# Patient Record
Sex: FEMALE | Race: WHITE | HISPANIC OR LATINO | Employment: PART TIME | URBAN - NONMETROPOLITAN AREA
[De-identification: names, ages, dates, MRNs, and addresses within clinical notes are randomized per-mention and may not be internally consistent; named-entity substitution may affect disease eponyms.]

---

## 2023-05-11 ENCOUNTER — HOSPITAL ENCOUNTER (EMERGENCY)
Facility: HOSPITAL | Age: 28
Discharge: HOME | End: 2023-05-12
Payer: MEDICAID

## 2023-05-11 DIAGNOSIS — R11.2 NAUSEA AND VOMITING, UNSPECIFIED VOMITING TYPE: ICD-10-CM

## 2023-05-11 DIAGNOSIS — Z3A.30 30 WEEKS GESTATION OF PREGNANCY: ICD-10-CM

## 2023-05-11 DIAGNOSIS — G43.901 MIGRAINE WITH STATUS MIGRAINOSUS, NOT INTRACTABLE, UNSPECIFIED MIGRAINE TYPE: Primary | ICD-10-CM

## 2023-05-11 PROCEDURE — 96375 TX/PRO/DX INJ NEW DRUG ADDON: CPT

## 2023-05-11 PROCEDURE — 99284 EMERGENCY DEPT VISIT MOD MDM: CPT

## 2023-05-11 PROCEDURE — 25000003 PHARM REV CODE 250

## 2023-05-11 PROCEDURE — 63600175 PHARM REV CODE 636 W HCPCS

## 2023-05-11 PROCEDURE — 96374 THER/PROPH/DIAG INJ IV PUSH: CPT

## 2023-05-11 PROCEDURE — 96361 HYDRATE IV INFUSION ADD-ON: CPT

## 2023-05-11 RX ORDER — DIPHENHYDRAMINE HYDROCHLORIDE 50 MG/ML
25 INJECTION INTRAMUSCULAR; INTRAVENOUS
Status: COMPLETED | OUTPATIENT
Start: 2023-05-11 | End: 2023-05-11

## 2023-05-11 RX ORDER — ONDANSETRON 4 MG/1
4 TABLET, ORALLY DISINTEGRATING ORAL EVERY 8 HOURS PRN
Qty: 20 TABLET | Refills: 0 | Status: SHIPPED | OUTPATIENT
Start: 2023-05-11

## 2023-05-11 RX ORDER — DEXAMETHASONE SODIUM PHOSPHATE 4 MG/ML
8 INJECTION, SOLUTION INTRA-ARTICULAR; INTRALESIONAL; INTRAMUSCULAR; INTRAVENOUS; SOFT TISSUE
Status: COMPLETED | OUTPATIENT
Start: 2023-05-11 | End: 2023-05-11

## 2023-05-11 RX ORDER — PROCHLORPERAZINE EDISYLATE 5 MG/ML
10 INJECTION INTRAMUSCULAR; INTRAVENOUS
Status: COMPLETED | OUTPATIENT
Start: 2023-05-11 | End: 2023-05-11

## 2023-05-11 RX ADMIN — DIPHENHYDRAMINE HYDROCHLORIDE 25 MG: 50 INJECTION INTRAMUSCULAR; INTRAVENOUS at 11:05

## 2023-05-11 RX ADMIN — PROCHLORPERAZINE EDISYLATE 10 MG: 5 INJECTION INTRAMUSCULAR; INTRAVENOUS at 11:05

## 2023-05-11 RX ADMIN — SODIUM CHLORIDE 1000 ML: 9 INJECTION, SOLUTION INTRAVENOUS at 11:05

## 2023-05-11 RX ADMIN — DEXAMETHASONE SODIUM PHOSPHATE 8 MG: 4 INJECTION, SOLUTION INTRA-ARTICULAR; INTRALESIONAL; INTRAMUSCULAR; INTRAVENOUS; SOFT TISSUE at 11:05

## 2023-05-12 VITALS
RESPIRATION RATE: 18 BRPM | OXYGEN SATURATION: 95 % | DIASTOLIC BLOOD PRESSURE: 60 MMHG | HEIGHT: 67 IN | SYSTOLIC BLOOD PRESSURE: 88 MMHG | WEIGHT: 187.38 LBS | BODY MASS INDEX: 29.41 KG/M2 | HEART RATE: 84 BPM

## 2023-05-12 NOTE — ED TRIAGE NOTES
Per pt family member pt is having increased vomiting and headache since yesterday. Pt is 7 months OB. Pt denies any abdominal pain.

## 2023-05-12 NOTE — ED PROVIDER NOTES
Encounter Date: 5/11/2023       History     Chief Complaint   Patient presents with    Vomiting     7 months preg.    Headache     28-year-old female presents complaining of migraine headache since yesterday.  She is 7 months pregnant.  She reports having similar headaches in the past.  These headaches are usually associated with nausea and vomiting.  She has had nausea and vomiting since yesterday as well.  She denies any abdominal pain or vaginal bleeding.  She denies fever or any neurologic symptoms.  She is a [place] speaker, but brought an , who is very good.    The history is provided by the patient. The history is limited by a language barrier. A  was used.   Review of patient's allergies indicates:   Allergen Reactions    Tylenol [acetaminophen]      No past medical history on file.  No past surgical history on file.  No family history on file.     Review of Systems   Gastrointestinal:  Positive for nausea and vomiting.   Neurological:  Positive for headaches.   All other systems reviewed and are negative.    Physical Exam     Initial Vitals [05/11/23 2152]   BP Pulse Resp Temp SpO2   120/75 73 18 -- 98 %      MAP       --         Physical Exam    Nursing note and vitals reviewed.  Constitutional: Vital signs are normal. She appears well-developed and well-nourished. She is cooperative.   HENT:   Head: Normocephalic and atraumatic.   Mouth/Throat: Oropharynx is clear and moist.   Eyes: Conjunctivae, EOM and lids are normal. Pupils are equal, round, and reactive to light.   Neck: Trachea normal. Neck supple.   Normal range of motion.  Cardiovascular:  Normal rate, regular rhythm, normal heart sounds and intact distal pulses.           Pulmonary/Chest: Breath sounds normal.   Abdominal: Abdomen is soft. Bowel sounds are normal.   Musculoskeletal:         General: Normal range of motion.      Cervical back: Normal, normal range of motion and neck supple.      Lumbar back: Normal.      Neurological: She is alert and oriented to person, place, and time. She has normal strength and normal reflexes. She displays normal reflexes. No cranial nerve deficit or sensory deficit. Coordination normal. GCS score is 15. GCS eye subscore is 4. GCS verbal subscore is 5. GCS motor subscore is 6.   Skin: Skin is warm, dry and intact. Capillary refill takes less than 2 seconds.   Psychiatric: She has a normal mood and affect. Her speech is normal and behavior is normal. Judgment and thought content normal. Cognition and memory are normal.       ED Course   Procedures  Labs Reviewed - No data to display       Imaging Results    None          Medications   sodium chloride 0.9% bolus 1,000 mL 1,000 mL (1,000 mLs Intravenous New Bag 5/11/23 2316)   prochlorperazine injection Soln 10 mg (10 mg Intravenous Given 5/11/23 2316)   dexAMETHasone injection 8 mg (8 mg Intravenous Given 5/11/23 2316)   diphenhydrAMINE injection 25 mg (25 mg Intravenous Given 5/11/23 2316)     Medical Decision Making:   Initial Assessment:   Migraine headache (recurrent), nausea and vomiting, 7 months pregnant  ED Management:  Compazine, Decadron, IV fluids, Benadryl  Pain resolved.  Resting comfortably.                        Clinical Impression:   Final diagnoses:  [G43.901] Migraine with status migrainosus, not intractable, unspecified migraine type (Primary)  [Z3A.30] 30 weeks gestation of pregnancy  [R11.2] Nausea and vomiting, unspecified vomiting type        ED Disposition Condition    Discharge Good          ED Prescriptions       Medication Sig Dispense Start Date End Date Auth. Provider    ondansetron (ZOFRAN-ODT) 4 MG TbDL Take 1 tablet (4 mg total) by mouth every 8 (eight) hours as needed. 20 tablet 5/11/2023 -- Sammy Renae MD          Follow-up Information       Follow up With Specialties Details Why Contact Info    OB  Call in 1 day               Sammy Renae MD  05/11/23 6886

## 2023-06-29 ENCOUNTER — HOSPITAL ENCOUNTER (EMERGENCY)
Facility: HOSPITAL | Age: 28
Discharge: HOME | End: 2023-06-29
Payer: MEDICAID

## 2023-06-29 VITALS
BODY MASS INDEX: 30.25 KG/M2 | SYSTOLIC BLOOD PRESSURE: 108 MMHG | OXYGEN SATURATION: 95 % | HEIGHT: 66 IN | RESPIRATION RATE: 15 BRPM | DIASTOLIC BLOOD PRESSURE: 52 MMHG | TEMPERATURE: 100 F | HEART RATE: 87 BPM

## 2023-06-29 DIAGNOSIS — N10 ACUTE PYELONEPHRITIS: Primary | ICD-10-CM

## 2023-06-29 DIAGNOSIS — R50.9 FEVER, UNSPECIFIED FEVER CAUSE: ICD-10-CM

## 2023-06-29 LAB
ALBUMIN SERPL-MCNC: 4.6 G/DL (ref 3.4–5)
ALBUMIN/GLOB SERPL: 1.4 RATIO
ALP SERPL-CCNC: 96 UNIT/L (ref 50–144)
ALT SERPL-CCNC: 31 UNIT/L (ref 1–45)
ANION GAP SERPL CALC-SCNC: 9 MEQ/L (ref 2–13)
APPEARANCE UR: CLEAR
AST SERPL-CCNC: 38 UNIT/L (ref 14–36)
B PERT.PT PRMT NPH QL NAA+NON-PROBE: NOT DETECTED
BACTERIA #/AREA URNS AUTO: ABNORMAL /HPF
BASOPHILS # BLD AUTO: 0.04 X10(3)/MCL (ref 0.01–0.08)
BASOPHILS NFR BLD AUTO: 0.5 % (ref 0.1–1.2)
BILIRUB UR QL STRIP.AUTO: NEGATIVE MG/DL
BILIRUBIN DIRECT+TOT PNL SERPL-MCNC: 0.5 MG/DL (ref 0–1)
BUN SERPL-MCNC: 13 MG/DL (ref 7–20)
C PNEUM DNA NPH QL NAA+NON-PROBE: NOT DETECTED
CALCIUM SERPL-MCNC: 8.9 MG/DL (ref 8.4–10.2)
CHLORIDE SERPL-SCNC: 104 MMOL/L (ref 98–110)
CO2 SERPL-SCNC: 24 MMOL/L (ref 21–32)
COLOR UR: YELLOW
CREAT SERPL-MCNC: 0.9 MG/DL (ref 0.66–1.25)
CREAT/UREA NIT SERPL: 14 (ref 12–20)
EOSINOPHIL # BLD AUTO: 0.27 X10(3)/MCL (ref 0.04–0.36)
EOSINOPHIL NFR BLD AUTO: 3 % (ref 0.7–7)
ERYTHROCYTE [DISTWIDTH] IN BLOOD BY AUTOMATED COUNT: 13.1 % (ref 11–14.5)
FLUAV H1 2009 PAN RNA NPH NAA+NON-PROBE: NORMAL
FLUAV H1 RNA NPH QL NAA+NON-PROBE: NORMAL
FLUAV H3 RNA NPH QL NAA+NON-PROBE: NORMAL
FLUAV RNA NPH QL NAA+NON-PROBE: NOT DETECTED
FLUAV RNA RESP QL NAA+PROBE: NORMAL
FLUBV RNA NPH QL NAA+NON-PROBE: NOT DETECTED
GFR SERPLBLD CREATININE-BSD FMLA CKD-EPI: 89 MLS/MIN/1.73/M2
GLOBULIN SER-MCNC: 3.4 GM/DL (ref 2–3.9)
GLUCOSE SERPL-MCNC: 102 MG/DL (ref 70–115)
GLUCOSE UR QL STRIP.AUTO: NEGATIVE MG/DL
HADV DNA NPH QL NAA+NON-PROBE: NOT DETECTED
HCOV 229E RNA NPH QL NAA+NON-PROBE: NOT DETECTED
HCOV HKU1 RNA NPH QL NAA+NON-PROBE: NOT DETECTED
HCOV NL63 RNA NPH QL NAA+NON-PROBE: NOT DETECTED
HCOV OC43 RNA NPH QL NAA+NON-PROBE: NOT DETECTED
HCT VFR BLD AUTO: 40.7 % (ref 36–48)
HGB BLD-MCNC: 13.9 G/DL (ref 11.8–16)
HMPV RNA NPH QL NAA+NON-PROBE: NOT DETECTED
HPIV1 RNA NPH QL NAA+NON-PROBE: NOT DETECTED
HPIV2 RNA NPH QL NAA+NON-PROBE: NOT DETECTED
HPIV3 RNA NPH QL NAA+NON-PROBE: NOT DETECTED
HPIV4 RNA NPH QL NAA+NON-PROBE: NOT DETECTED
IMM GRANULOCYTES # BLD AUTO: 0.02 X10(3)/MCL (ref 0–0.03)
IMM GRANULOCYTES NFR BLD AUTO: 0.2 % (ref 0–0.5)
KETONES UR QL STRIP.AUTO: NEGATIVE MG/DL
LACTATE SERPL-SCNC: 1.9 MMOL/L (ref 0.4–2)
LEUKOCYTE ESTERASE UR QL STRIP.AUTO: ABNORMAL UNIT/L
LYMPHOCYTES # BLD AUTO: 0.61 X10(3)/MCL (ref 1.16–3.74)
LYMPHOCYTES NFR BLD AUTO: 6.9 % (ref 20–55)
M PNEUMO DNA NPH QL NAA+NON-PROBE: NOT DETECTED
MCH RBC QN AUTO: 29 PG (ref 27–34)
MCHC RBC AUTO-ENTMCNC: 34.2 G/DL (ref 31–37)
MCV RBC AUTO: 85 FL (ref 79–99)
MONOCYTES # BLD AUTO: 0.37 X10(3)/MCL (ref 0.24–0.36)
MONOCYTES NFR BLD AUTO: 4.2 % (ref 4.7–12.5)
NEUTROPHILS # BLD AUTO: 7.55 X10(3)/MCL (ref 1.56–6.13)
NEUTROPHILS NFR BLD AUTO: 85.2 % (ref 37–73)
NITRITE UR QL STRIP.AUTO: POSITIVE
NRBC BLD AUTO-RTO: 0 %
PH UR STRIP.AUTO: 8.5 [PH]
PLATELET # BLD AUTO: 212 X10(3)/MCL (ref 140–371)
PMV BLD AUTO: 10.5 FL (ref 9.4–12.4)
POTASSIUM SERPL-SCNC: 3.6 MMOL/L (ref 3.5–5.1)
PROT SERPL-MCNC: 8 GM/DL (ref 6.3–8.2)
PROT UR QL STRIP.AUTO: NEGATIVE MG/DL
RAPID GROUP A STREP (OHS): NEGATIVE
RBC # BLD AUTO: 4.79 X10(6)/MCL (ref 4–5.1)
RBC #/AREA URNS AUTO: ABNORMAL /HPF
RBC UR QL AUTO: ABNORMAL UNIT/L
RSV RNA NPH QL NAA+NON-PROBE: NOT DETECTED
RSV RNA NPH QL NAA+NON-PROBE: NOT DETECTED
RV+EV RNA NPH QL NAA+NON-PROBE: NOT DETECTED
SARS-COV-2 RNA RESP QL NAA+PROBE: NOT DETECTED
SODIUM SERPL-SCNC: 137 MMOL/L (ref 135–145)
SP GR UR STRIP.AUTO: 1.01
SQUAMOUS #/AREA URNS AUTO: ABNORMAL /HPF
UROBILINOGEN UR STRIP-ACNC: 0.2 MG/DL
WBC # SPEC AUTO: 8.86 X10(3)/MCL (ref 4–11.5)
WBC #/AREA URNS AUTO: ABNORMAL /HPF

## 2023-06-29 PROCEDURE — 80053 COMPREHEN METABOLIC PANEL: CPT

## 2023-06-29 PROCEDURE — 96361 HYDRATE IV INFUSION ADD-ON: CPT

## 2023-06-29 PROCEDURE — 83605 ASSAY OF LACTIC ACID: CPT

## 2023-06-29 PROCEDURE — 87651 STREP A DNA AMP PROBE: CPT

## 2023-06-29 PROCEDURE — 87088 URINE BACTERIA CULTURE: CPT

## 2023-06-29 PROCEDURE — 63600175 PHARM REV CODE 636 W HCPCS

## 2023-06-29 PROCEDURE — 87186 SC STD MICRODIL/AGAR DIL: CPT

## 2023-06-29 PROCEDURE — 99284 EMERGENCY DEPT VISIT MOD MDM: CPT | Mod: 25

## 2023-06-29 PROCEDURE — 96365 THER/PROPH/DIAG IV INF INIT: CPT

## 2023-06-29 PROCEDURE — 36415 COLL VENOUS BLD VENIPUNCTURE: CPT

## 2023-06-29 PROCEDURE — 85025 COMPLETE CBC W/AUTO DIFF WBC: CPT

## 2023-06-29 PROCEDURE — 81001 URINALYSIS AUTO W/SCOPE: CPT

## 2023-06-29 PROCEDURE — 87040 BLOOD CULTURE FOR BACTERIA: CPT

## 2023-06-29 PROCEDURE — 25000003 PHARM REV CODE 250

## 2023-06-29 PROCEDURE — 87798 DETECT AGENT NOS DNA AMP: CPT

## 2023-06-29 RX ORDER — NITROFURANTOIN 25; 75 MG/1; MG/1
100 CAPSULE ORAL 2 TIMES DAILY
Qty: 14 CAPSULE | Refills: 0 | Status: SHIPPED | OUTPATIENT
Start: 2023-06-29 | End: 2023-07-06

## 2023-06-29 RX ORDER — IBUPROFEN 600 MG/1
600 TABLET ORAL
Status: COMPLETED | OUTPATIENT
Start: 2023-06-29 | End: 2023-06-29

## 2023-06-29 RX ADMIN — SODIUM CHLORIDE 1000 ML: 9 INJECTION, SOLUTION INTRAVENOUS at 09:06

## 2023-06-29 RX ADMIN — DEXTROSE MONOHYDRATE 2 G: 5 INJECTION INTRAVENOUS at 09:06

## 2023-06-29 RX ADMIN — IBUPROFEN 600 MG: 600 TABLET, FILM COATED ORAL at 07:06

## 2023-06-29 RX ADMIN — SODIUM CHLORIDE 1000 ML: 9 INJECTION, SOLUTION INTRAVENOUS at 07:06

## 2023-06-30 NOTE — ED PROVIDER NOTES
Encounter Date: 2023       History     Chief Complaint   Patient presents with    Fever     Pt had c section x 15 days ago and now has a fever. Pt denies taking any medications at home for fever. Denies any issues during the pregnancy.  needed.      28-year-old female presents complaining of high fever and myalgias.  These symptoms began today.  She had an uneventful  15 days ago.  She is breastfeeding, but has no breast pain.  She denies any cough, sore throat or other respiratory symptoms.    The history is provided by the patient. The history is limited by a language barrier. A  was used.   Review of patient's allergies indicates:   Allergen Reactions    Tylenol [acetaminophen]      No past medical history on file.  No past surgical history on file.  No family history on file.     Review of Systems   Constitutional:  Positive for fever.   HENT:  Negative for sore throat.    Respiratory:  Negative for shortness of breath.    Cardiovascular:  Negative for chest pain.   Gastrointestinal:  Negative for nausea.   Genitourinary:  Negative for dysuria.   Musculoskeletal:  Positive for myalgias. Negative for back pain.   Skin:  Negative for rash.   Neurological:  Negative for weakness.   Hematological:  Does not bruise/bleed easily.   All other systems reviewed and are negative.    Physical Exam     Initial Vitals [23]   BP Pulse Resp Temp SpO2   116/84 (!) 145 (!) 22 (!) 103 °F (39.4 °C) 96 %      MAP       --         Physical Exam    Nursing note and vitals reviewed.  Constitutional: Vital signs are normal. She appears well-developed and well-nourished. She is cooperative.   HENT:   Head: Normocephalic and atraumatic.   Mouth/Throat: Oropharynx is clear and moist.   Eyes: Conjunctivae, EOM and lids are normal. Pupils are equal, round, and reactive to light.   Neck: Trachea normal. Neck supple.   Normal range of motion.  Cardiovascular:  Regular rhythm,  normal heart sounds and intact distal pulses.           She is tachycardic   Pulmonary/Chest: Breath sounds normal.   Abdominal: Abdomen is soft. Bowel sounds are normal. There is no abdominal tenderness.    incision is well healed, with no erythema or drainage.  There is no induration or tenderness   Musculoskeletal:         General: Normal range of motion.      Cervical back: Normal, normal range of motion and neck supple.      Lumbar back: Normal.     Neurological: She is alert and oriented to person, place, and time. She has normal strength. Coordination normal.   Skin: Skin is warm, dry and intact. Capillary refill takes less than 2 seconds.   Psychiatric: She has a normal mood and affect. Her speech is normal and behavior is normal. Judgment and thought content normal. Cognition and memory are normal.     ED Course   Procedures  Labs Reviewed   COMPREHENSIVE METABOLIC PANEL - Abnormal; Notable for the following components:       Result Value    Aspartate Aminotransferase 38 (*)     All other components within normal limits   URINALYSIS, REFLEX TO URINE CULTURE - Abnormal; Notable for the following components:    Blood, UA Large (*)     Nitrites, UA Positive (*)     Leukocyte Esterase, UA Trace (*)     All other components within normal limits    Narrative:      URINE STABILITY IS 2 HOURS AT ROOM TEMP OR    SIX HOURS REFRIGERATED. PERFORMING TESTING ON    SPECIMENS GREATER THAN THIS AGE MAY AFFECT THE    FOLLOWING TESTS:    PH          SPECIFIC GRAVITY           BLOOD    CLARITY     BILIRUBIN               UROBILINOGEN   CBC WITH DIFFERENTIAL - Abnormal; Notable for the following components:    Neut % 85.2 (*)     Lymph % 6.9 (*)     Mono % 4.2 (*)     Lymph # 0.61 (*)     Neut # 7.55 (*)     Mono # 0.37 (*)     All other components within normal limits   URINALYSIS, MICROSCOPIC - Abnormal; Notable for the following components:    Bacteria, UA 4+ (*)     WBC, UA 6-10 (*)     All other components within  normal limits   THROAT SCREEN, RAPID STREP - Normal   LACTIC ACID, PLASMA - Normal   BLOOD CULTURE [facility]   BLOOD CULTURE [facility]   CULTURE, URINE   CBC W/ AUTO DIFFERENTIAL    Narrative:     The following orders were created for panel order CBC auto differential.  Procedure                               Abnormality         Status                     ---------                               -----------         ------                     CBC with Differential[[id number]]        Abnormal            Final result                 Please view results for these tests on the individual orders.   RESPIRATORY PANEL    Narrative:     The BioFire Respiratory Panel 2.1 (RP2.1) is a PCR-based multiplexed nucleic acid test intended for use with the BioFire® 2.0 for simultaneous qualitative detection and identification of multiple respiratory viral and bacterial nucleic acids in nasopharyngeal swabs (NPS) obtained from individuals suspected of respiratory tract infections.          Imaging Results              X-Ray Chest AP Portable (Preliminary result)  Result time 06/29/23 20:07:56      Wet Read by Sammy Renae MD (06/29/23 20:07:56, [facility], Emergency Medicine)    Normal cardiac silhouette, clear lung fields, no infiltrates                                     Medications   cefTRIAXone (ROCEPHIN) 2 g in dextrose 5 % in water (D5W) 5 % 100 mL IVPB (MB+) (2 g Intravenous New Bag 6/29/23 2104)   sodium chloride 0.9% bolus 1,000 mL 1,000 mL (1,000 mLs Intravenous New Bag 6/29/23 2104)   sodium chloride 0.9% bolus 1,000 mL 1,000 mL (0 mLs Intravenous Stopped 6/29/23 2106)   ibuprofen tablet 600 mg (600 mg Oral Given 6/29/23 1953)     Medical Decision Making:   Initial Assessment:   Fever with minimal other systemic symptoms.  Patient qualifies for sepsis, based on vital signs alone.  Differential Diagnosis:   UTI, pneumonia, viral fever, strep throat  ED Management:  Septic workup, respiratory panel, rapid  strep  Ibuprofen, IV fluids, Rocephin                        Clinical Impression:   Final diagnoses:  [R50.9] Fever, unspecified fever cause  [N10] Acute pyelonephritis (Primary)        ED Disposition Condition    Discharge Good          ED Prescriptions       Medication Sig Dispense Start Date End Date Auth. Provider    nitrofurantoin, macrocrystal-monohydrate, (MACROBID) 100 MG capsule Take 1 capsule (100 mg total) by mouth 2 (two) times daily. for 7 days 14 capsule 6/29/2023 7/6/2023 Sammy Renae MD          Follow-up Information       Follow up With Specialties Details Why Contact Info    PCP  Call in 1 day               Sammy Renae MD  06/29/23 [id number]

## 2023-07-02 LAB — BACTERIA UR CULT: ABNORMAL

## 2023-07-04 LAB
BACTERIA BLD CULT: NORMAL
BACTERIA BLD CULT: NORMAL

## 2023-10-02 PROBLEM — N10 ACUTE PYELONEPHRITIS: Status: RESOLVED | Noted: 2023-06-29 | Resolved: 2023-10-02
